# Patient Record
Sex: MALE
[De-identification: names, ages, dates, MRNs, and addresses within clinical notes are randomized per-mention and may not be internally consistent; named-entity substitution may affect disease eponyms.]

---

## 2017-11-16 ENCOUNTER — CHARTING TRANS (OUTPATIENT)
Dept: OTHER | Age: 57
End: 2017-11-16

## 2018-04-23 ENCOUNTER — HOSPITAL (OUTPATIENT)
Dept: OTHER | Age: 58
End: 2018-04-23
Attending: FAMILY MEDICINE

## 2018-11-02 VITALS — WEIGHT: 293 LBS | HEIGHT: 73 IN | BODY MASS INDEX: 38.83 KG/M2

## 2024-04-16 ENCOUNTER — LAB REQUISITION (OUTPATIENT)
Dept: LAB | Age: 64
End: 2024-04-16

## 2024-04-16 ENCOUNTER — LAB SERVICES (OUTPATIENT)
Dept: LAB | Age: 64
End: 2024-04-16

## 2024-04-16 DIAGNOSIS — Z12.5 ENCOUNTER FOR SCREENING FOR MALIGNANT NEOPLASM OF PROSTATE: ICD-10-CM

## 2024-04-16 DIAGNOSIS — N40.0 BENIGN PROSTATIC HYPERPLASIA WITHOUT LOWER URINARY TRACT SYMPTOMS: ICD-10-CM

## 2024-04-16 LAB — PSA SERPL-MCNC: 1.06 NG/ML

## 2024-04-16 PROCEDURE — 84153 ASSAY OF PSA TOTAL: CPT | Performed by: CLINICAL MEDICAL LABORATORY

## 2024-04-16 PROCEDURE — 36415 COLL VENOUS BLD VENIPUNCTURE: CPT | Performed by: CLINICAL MEDICAL LABORATORY

## 2024-07-06 ENCOUNTER — ORDER TRANSCRIPTION (OUTPATIENT)
Dept: ADMINISTRATIVE | Facility: HOSPITAL | Age: 64
End: 2024-07-06

## 2024-07-06 DIAGNOSIS — Z13.6 SCREENING FOR CARDIOVASCULAR CONDITION: Primary | ICD-10-CM

## 2024-07-17 ENCOUNTER — HOSPITAL ENCOUNTER (OUTPATIENT)
Dept: CT IMAGING | Facility: HOSPITAL | Age: 64
Discharge: HOME | End: 2024-07-17
Attending: FAMILY MEDICINE

## 2024-07-17 VITALS
HEIGHT: 72.99 IN | SYSTOLIC BLOOD PRESSURE: 130 MMHG | WEIGHT: 275 LBS | BODY MASS INDEX: 36.45 KG/M2 | DIASTOLIC BLOOD PRESSURE: 82 MMHG

## 2024-07-17 DIAGNOSIS — Z13.6 SCREENING FOR CARDIOVASCULAR CONDITION: ICD-10-CM

## 2024-07-17 LAB
GLUCOSE POC: 107 MG/DL (ref 70–100)
HDL POC: 34 MG/DL (ref 40–60)
LDL POC: 122 MG/DL (ref 0–130)
TC/HDL RATIO: 5 (ref 0–5)
TOTAL CHOLESTEROL POC: 178 MG/DL (ref 0–200)
TRIGLYCERIDES POC: 108 MG/DL (ref 0–200)

## 2024-07-17 NOTE — PROGRESS NOTES
Date of Service 7/17/2024    YENI BRADY  Date of Birth 11/3/1960    Patient Age: 63 year old    PCP: Abhi Edwards  [address] [address]    Heart Scan Consult  Preliminary Heart Scan Score: 73.5    Previous Screening  Heart Scan Completed Previously: Yes  Year of last heart scan: 0  Score of last heart scan: 2019  Peripheral Vascular Scan Completed Previously: No          Risk Factors  Personal Risk Factors  Non-alterable Risk Factors: Personal History;Age;Gender  Alterable Risk Factors: Abnormal Cholesterol;Lack of exercise;Obesity;Stress      Body Mass Index  Body mass index is 36.29 kg/m².    Blood Pressure     /82 (BP Location: Left arm)     (Normal =< 120/80,  Elevated = 120-129/ >80,  High Stage1 130-139/80-89 , Stage2 >140/>90)    Lipid Profile  Patient was in fasting state: Yes    Cholesterol: 178, done on 7/17/2024.  HDL Cholesterol: 34, done on 7/17/2024.  LDL Cholesterol: 122, done on 7/17/2024.  TriGlycerides 108, done on 7/17/2024.    Cholesterol Goals  Value   Total  =< 200   HDL  = > 45 Men = > 55 Women   LDL   =< 100   Triglycerides  =< 150       Glucose and Hemoglobin A1C  Lab Results   Component Value Date     (A) 07/17/2024     (Normal Fasting Glucose < 100mg/dl )    Nurse Review  Risk factor information and results reviewed with Nurse: Yes    Recommended Follow Up:  Consult your physician regarding:: Final Heart Scan Report;Discuss potential for Incidental Finding;Discuss Potential for Score Variance;Exercise Program Clearance      Recommendations for Change:  Nutrition Changes: Low Saturated Fat;Increase Fiber;Low Fat Dairy;Low Salt Eating (Eats mostly chicken, grills, uses olive oil.) Encouraged to limit carbohydrates.    Cholesterol Modification (goal of therapy depends upon your risk): Increase HDL (Healthy/Good) Normal >45 Men >55 Women;Decrease LDL (Lousy/Bad) Ideal <100    Exercise: Initiate Program (Encouraged to set attainable goals and to start  slowly.)    Smoking Cessation: No Change Needed    Weight Management: Decrease Current Weight    Stress Management: Adopt Stress Management Techniques    Repeat Heart Scan: Discuss with your Physician;3 Years if Calcium Score is > 0.0              [facility] Recommended Resources:  Recommended Resources: Smoking Cessation Options [phone];[employer] [facility] [phone] ([facility]);[employer] [facility] [phone] ([facility]);[facility] [phone]            Indira JOHNSON RN        Please Contact the Nurse Heart Line with any Questions or Concerns [phone].